# Patient Record
Sex: MALE | Race: WHITE
[De-identification: names, ages, dates, MRNs, and addresses within clinical notes are randomized per-mention and may not be internally consistent; named-entity substitution may affect disease eponyms.]

---

## 2018-01-14 ENCOUNTER — HOSPITAL ENCOUNTER (EMERGENCY)
Dept: HOSPITAL 95 - ER | Age: 33
Discharge: HOME | End: 2018-01-14
Payer: COMMERCIAL

## 2018-01-14 VITALS — BODY MASS INDEX: 32.21 KG/M2 | WEIGHT: 225 LBS | HEIGHT: 70 IN

## 2018-01-14 DIAGNOSIS — Z79.52: ICD-10-CM

## 2018-01-14 DIAGNOSIS — Z88.0: ICD-10-CM

## 2018-01-14 DIAGNOSIS — F17.200: ICD-10-CM

## 2018-01-14 DIAGNOSIS — L23.7: Primary | ICD-10-CM

## 2021-05-07 NOTE — NUR
Ambulatory in Day Surgery.
History, Chart, Medications and Allergies reviewed before start of
procedure. Lungs clear T/O to Auscultation.
Patient confirms NPO status and agrees with scheduled surgery.
Pre-Op teaching done. Pt verbalizes understanding.
Patient States Post-Procedure ride home has been arranged.

## 2021-05-07 NOTE — NUR
Patient up to Ambulate independently. Gait steady.
Discharge instructions reviewed with patient. Patient verbalizes understanding.
Copy given to patient to take home.
Discharged via wheelchair to private car for ride home.

## 2021-05-07 NOTE — NUR
ASSUMED CARE OF PT. TOOK REPORT FROM SAMMY RED. PT WITH C/O ABDOMINAL
CRAMPING. PT EDUCATED THAT THIS IS MOST LIKELY DUE TO THE CO2 USED DURING THE
PROCEDURE AND WILL MOST LIKELY IMPROVE WITH AMBULATION AND TIME. PT LYING
COMFORTABLY IN BED WITH EYES CLOSED. CALL LIGTH WITHIN REACH. VSS. WARM
COMPRESS APPLIED TO ABDOMEN. WILL CONTINUE TO MONITOR.